# Patient Record
(demographics unavailable — no encounter records)

---

## 2025-03-17 NOTE — REVIEW OF SYSTEMS
[Fatigue] : fatigue [Hoarseness] : hoarseness [Sore Throat] : sore throat [Postnasal Drip] : postnasal drip [Negative] : Integumentary [FreeTextEntry4] : L ear fullness,

## 2025-03-17 NOTE — PHYSICAL EXAM
[No Acute Distress] : no acute distress [Well Nourished] : well nourished [Well-Appearing] : well-appearing [Normal Outer Ear/Nose] : the outer ears and nose were normal in appearance [Normal TMs] : both tympanic membranes were normal [No Rash] : no rash [Normal] : no rash [de-identified] : + erythema posterior pharynx

## 2025-03-17 NOTE — PLAN
[FreeTextEntry1] : Pharyngitis  WHAT YOU NEED TO KNOW:  What is pharyngitis? Pharyngitis, or sore throat, is inflammation of the tissues and structures in your pharynx (throat). Pharyngitis is most often caused by bacteria or a virus. Other causes include smoking, allergies, or acid reflux.  What are the signs and symptoms of pharyngitis? Signs and symptoms depend on the cause of your pharyngitis. You may have any of the following:  Sore throat or pain when you swallow  Fever, chills, and body aches  Hoarse or raspy voice  Cough, runny or stuffy nose, itchy or watery eyes  Headache  Upset stomach and loss of appetite  Whitish-yellow patches on the back of the throat  Tender, swollen lumps on the sides of the neck How is pharyngitis diagnosed? Tell your healthcare provider about your symptoms and when they started. Your provider may look inside your throat and feel your neck. You may also need the following tests:  A throat culture may show which germ is causing your sore throat. A cotton swab is rubbed against the back of your throat.  Blood tests may be used to show if another medical condition is causing your sore throat. How is pharyngitis treated? Viral pharyngitis will go away on its own without treatment. Your sore throat should start to feel better in 3 to 5 days. You may need medicines to decrease pain and swelling or to treat a bacterial infection.  How can I manage my symptoms?  Gargle salt water. Mix  teaspoon salt in an 8 ounce glass of warm water and gargle. Do not swallow. Salt water may help decrease swelling in your throat.  Drink liquids as directed. You may need to drink more liquids than usual. Liquids may help soothe your throat and prevent dehydration. Ask how much liquid to drink each day and which liquids are best for you.  Use a cool mist humidifier. This will add moisture to the air and help decrease your cough. Humidifier   Soothe your throat. Cough drops, ice, soft foods, or popsicles may help decrease throat pain. How can I prevent the spread of pharyngitis? Cover your mouth and nose when you cough or sneeze. Do not share food or drinks. Wash your hands often. Use soap and water. If soap and water are unavailable, use an alcohol-based hand . Handwashing  Call your local emergency number (911 in the [place]) if:  You have trouble breathing or swallowing because your throat is swollen.  When should I seek immediate care?  You are drooling because it hurts too much to swallow.  Your fever is higher than 102F (39C) or lasts longer than 3 days.  You are confused.  You taste blood. When should I call my doctor?  Your throat pain gets worse.  You have a painful lump in your throat that does not go away after 5 days.  Your symptoms do not improve after 5 days.  You have questions or concerns about your condition or care. CARE AGREEMENT:  You have the right to help plan your care. Learn about your health condition and how it may be treated. Discuss treatment options with your healthcare providers to decide what care you want to receive. You always have the right to refuse treatment.   [employer] 1973, 2025   	 back to top  	Adds current document to the Print List.	Customize	Print documents

## 2025-03-17 NOTE — HISTORY OF PRESENT ILLNESS
[FreeTextEntry8] : Pt states she has had a sore throat, painful during the night for the past 7 days.  Denies fever/chills.  Denies body aches, nausea..